# Patient Record
Sex: FEMALE | Race: OTHER | NOT HISPANIC OR LATINO
[De-identification: names, ages, dates, MRNs, and addresses within clinical notes are randomized per-mention and may not be internally consistent; named-entity substitution may affect disease eponyms.]

---

## 2017-12-08 ENCOUNTER — TRANSCRIPTION ENCOUNTER (OUTPATIENT)
Age: 47
End: 2017-12-08

## 2017-12-12 PROBLEM — Z00.00 ENCOUNTER FOR PREVENTIVE HEALTH EXAMINATION: Status: ACTIVE | Noted: 2017-12-12

## 2017-12-14 ENCOUNTER — APPOINTMENT (OUTPATIENT)
Dept: ORTHOPEDIC SURGERY | Facility: CLINIC | Age: 47
End: 2017-12-14
Payer: COMMERCIAL

## 2017-12-14 VITALS
DIASTOLIC BLOOD PRESSURE: 69 MMHG | SYSTOLIC BLOOD PRESSURE: 125 MMHG | WEIGHT: 103 LBS | BODY MASS INDEX: 18.25 KG/M2 | HEART RATE: 66 BPM | HEIGHT: 63 IN

## 2017-12-14 DIAGNOSIS — Z87.39 PERSONAL HISTORY OF OTHER DISEASES OF THE MUSCULOSKELETAL SYSTEM AND CONNECTIVE TISSUE: ICD-10-CM

## 2017-12-14 DIAGNOSIS — Z87.09 PERSONAL HISTORY OF OTHER DISEASES OF THE RESPIRATORY SYSTEM: ICD-10-CM

## 2017-12-14 DIAGNOSIS — J45.909 UNSPECIFIED ASTHMA, UNCOMPLICATED: ICD-10-CM

## 2017-12-14 DIAGNOSIS — M51.36 OTHER INTERVERTEBRAL DISC DEGENERATION, LUMBAR REGION: ICD-10-CM

## 2017-12-14 PROCEDURE — 99204 OFFICE O/P NEW MOD 45 MIN: CPT

## 2017-12-14 PROCEDURE — 72100 X-RAY EXAM L-S SPINE 2/3 VWS: CPT

## 2019-02-10 ENCOUNTER — TRANSCRIPTION ENCOUNTER (OUTPATIENT)
Age: 49
End: 2019-02-10

## 2019-02-10 ENCOUNTER — EMERGENCY (EMERGENCY)
Facility: HOSPITAL | Age: 49
LOS: 1 days | Discharge: HOME | End: 2019-02-10
Attending: EMERGENCY MEDICINE
Payer: COMMERCIAL

## 2019-02-10 VITALS
RESPIRATION RATE: 16 BRPM | WEIGHT: 104.06 LBS | HEIGHT: 62 IN | HEART RATE: 82 BPM | TEMPERATURE: 98 F | DIASTOLIC BLOOD PRESSURE: 69 MMHG | OXYGEN SATURATION: 99 % | SYSTOLIC BLOOD PRESSURE: 109 MMHG

## 2019-02-10 PROCEDURE — 29515 APPLICATION SHORT LEG SPLINT: CPT

## 2019-02-10 PROCEDURE — 99282 EMERGENCY DEPT VISIT SF MDM: CPT | Mod: 25

## 2019-02-10 PROCEDURE — 29515 APPLICATION SHORT LEG SPLINT: CPT | Mod: RT

## 2019-02-10 NOTE — ED PROVIDER NOTE - PHYSICAL EXAMINATION
General: Alert and Oriented. No apparent distress.  Head: Normocephalic and atraumatic.  Eyes: PERRLA with EOMI.  Neck: Supple. Trachea midline.   Cardiac: Normal S1 and S2 w/ RRR. No murmurs appreciated.   Pulmonary: Vesicular breath sounds bilaterally. No increased WOB. No wheezes or crackles.  Abdominal: Soft, non-tender. Normoactive bowel sounds.  Neurologic: No focal sensory or motor deficits.  Musculoskeletal: Strength appropriate in all 4 extremities for age with no limited ROM.  Skin: Color appropriate for race. Intact, warm, and well-perfused.  Psychiatric: Appropriate mood and affect. No apparent risk to self or others. General: Alert and Oriented. No apparent distress.  Head: Normocephalic and atraumatic.  Eyes: PERRLA with EOMI.  Neck: Supple. Trachea midline.   Cardiac: Normal S1 and S2 w/ RRR. No murmurs appreciated.   Pulmonary: Vesicular breath sounds bilaterally. No increased WOB. No wheezes or crackles.  Abdominal: Soft, non-tender. Normoactive bowel sounds.  Neurologic: No focal sensory or motor deficits.  Musculoskeletal: R posterior ankle w/ divot in the achilles tendon. 4/5 plantar felxion 5/5 dorsiflexion of right foot. +DP pulses b/l, sensation intact to feet and ankle b/l. -ve squeeze test  Skin: Color appropriate for race. Intact, warm, and well-perfused.  Psychiatric: Appropriate mood and affect. No apparent risk to self or others.

## 2019-02-10 NOTE — ED PROCEDURE NOTE - ATTENDING CONTRIBUTION TO CARE
Dr. Gardner (Attending Physician)  I supervised the above procedure and agree with the documented note.

## 2019-02-10 NOTE — ED ADULT TRIAGE NOTE - CCCP TRG CHIEF CMPLNT
rt foot/heel injury Implemented All Universal Safety Interventions:  [place] to call system. Call bell, personal items and telephone within reach. Instruct patient to call for assistance. Room bathroom lighting operational. Non-slip footwear when patient is off stretcher. Physically safe environment: no spills, clutter or unnecessary equipment. Stretcher in lowest position, wheels locked, appropriate side rails in place.

## 2019-02-10 NOTE — ED PROVIDER NOTE - OBJECTIVE STATEMENT
48 F no significant PMHx was at the gym doing a side lunge when she twisted her ankle and heard a pop w/ pain. Now feeling as though her ankle is very "loose" and "floating" and the posterior heel feels numb but not painful at rest. Was able to walk after the event to a bench and has since been using crutches. Went to [facility] and was placed in a posterior leg splint, sent to ED to see orthopedist for ruptured achilles tendon.

## 2019-02-10 NOTE — ED PROVIDER NOTE - NSFOLLOWUPINSTRUCTIONS_ED_ALL_ED_FT
Please seek care if you have new chest pain, shortness of breath, fevers, inability to eat or drink, or worsening of symptoms that brought you to the emergency room today.  Please follow up with your primary care physician in 1-2 days or as soon as possible.     Apply ice to affected area for up to 20 minutes no more than 3 times per day. Take ibuprofen 400mg every 6-8 hours only as needed for pain. Keep the affected part of the body elevated to help reduce swelling. Please follow up with an orthopedic surgeon in within 5 days. Referral list provided.

## 2019-02-10 NOTE — ED PROCEDURE NOTE - PROCEDURE ADDITIONAL DETAILS
Short posterior leg splint applied with webril, orthoglass and ace bandage. Instructed to keep splint dry and not bare weight on the leg at least until seeing an orthopedist.  Instructed pt to return to ER if new pain, numbness, skin discoloration.

## 2019-02-10 NOTE — ED CLERICAL - NS ED CLERK NOTE PRE-ARRIVAL INFORMATION; ADDITIONAL PRE-ARRIVAL INFORMATION
CC/Reason For referral: ruptured achilles tendon. placed a posterior splint on pt.  Preferred Consultant(if applicable): no  Who admits for you (if needed): no  Do you have documents you would like to fax over? already faxed by ER referral line to ED fax  Would you still like to speak to an ED attending? no

## 2019-02-10 NOTE — ED PROVIDER NOTE - MEDICAL DECISION MAKING DETAILS
48 F with ankle injury on exam has defect in the achilles tendon. Likely partial tendon tear given strength still mostly intact and plantar felxion intact. Plan: splint, orthopedics f/u

## 2019-02-10 NOTE — ED PROVIDER NOTE - ATTENDING CONTRIBUTION TO CARE
Dr. Gardner (Attending Physician)  I performed a history and physical exam of the patient and discussed their management with the resident. I reviewed the resident's note and agree with the documented findings and plan of care. My medical decision making and observations are found above.

## 2019-02-10 NOTE — ED PROVIDER NOTE - NSFOLLOWUPCLINICS_GEN_ALL_ED_FT
[facility] Orthopedic Surgery  Orthopedic Surgery  [address], [address]rd & [address]th [place] [address]  Phone: [phone]  Fax:   Follow Up Time:

## 2019-02-11 ENCOUNTER — APPOINTMENT (OUTPATIENT)
Dept: ORTHOPEDIC SURGERY | Facility: CLINIC | Age: 49
End: 2019-02-11
Payer: COMMERCIAL

## 2019-02-11 VITALS
WEIGHT: 104 LBS | HEIGHT: 63 IN | SYSTOLIC BLOOD PRESSURE: 99 MMHG | BODY MASS INDEX: 18.43 KG/M2 | DIASTOLIC BLOOD PRESSURE: 66 MMHG | HEART RATE: 66 BPM

## 2019-02-11 DIAGNOSIS — S86.011A STRAIN OF RIGHT ACHILLES TENDON, INITIAL ENCOUNTER: ICD-10-CM

## 2019-02-11 DIAGNOSIS — S86.019A STRAIN OF UNSPECIFIED ACHILLES TENDON, INITIAL ENCOUNTER: ICD-10-CM

## 2019-02-11 PROCEDURE — 99204 OFFICE O/P NEW MOD 45 MIN: CPT

## 2019-02-11 PROCEDURE — 73630 X-RAY EXAM OF FOOT: CPT | Mod: RT

## 2019-02-12 RX ORDER — MELOXICAM 7.5 MG/1
7.5 TABLET ORAL
Qty: 30 | Refills: 0 | Status: ACTIVE | COMMUNITY
Start: 2019-02-12 | End: 1900-01-01

## 2019-02-12 NOTE — DISCUSSION/SUMMARY
[Surgical risks reviewed] : Surgical risks reviewed [de-identified] : Discussed with patient nature of condition, potential course / sequelae, options reviewed including conservative management with equinus cast immobilization deferred by patient and patient specifically requesting surgical intervention operative Achilles tendon repair procedure.  Risks, benefits, indications, alternatives reviewed in detail, risks including but not specifically limited to bleeding, infection, neurovascular / tendon injury, residual pain weakness stiffness swelling instability, retear, failure of repair, wound drainage sequelae, thrombosis, cardiopulmonary sequelae, amongst others; also aware potential necessity ancillary surgery in future, amongst others.  All questions answered, patient understands and wishes to proceed.  Consent SOC / for scheduling.  Cam boot immobilization in the interim, non-weight bearing status, DVT prophylaxis protocol per Medicine PMD.  Educational handouts provided.

## 2019-02-12 NOTE — PHYSICAL EXAM
[de-identified] : Extremity: soft tissue swelling and associated tenderness R distal Achilles with palpable gap, unable to actively plantarflex R ankle, Bonilla testing abnormal R LE.  Nontender R ankle, peroneals, syndesmosis, hindfoot ST, midfoot LF and PTT insertional, and forefoot.  Stable Drawer testing R ankle, calves soft, sensorimotor unchanged, skin intact B LE, 1+ R DP and PT pulses.  AOx3, mood / affect normal. [de-identified] : Radiographs (3v R foot) reveal tiny posterior calcaneal enthesophyte R hindfoot.

## 2019-02-12 NOTE — HISTORY OF PRESENT ILLNESS
[FreeTextEntry1] : 48 year female presents for evaluation of R Achilles tendon rupture x 2/10/19. Pt heard "pop" when she moved her leg while doing [employer] impact exercise regimen. Pt initially went to ER where US showed partial tear of R Achilles tendon and was discharged with splint and crutches. Pt states the pain is intermittent and currently have no pain today. Pt reports numbness on R foot. Pt denies any previous injuries/fx to R foot/ankle. Pt has splint on R LE and using crutches for ambulation. Denies additional musculoskeletal complaints referable to foot/ankle. Pt has hx of R DVT removal x 2001 due to varicose veins. \par

## 2019-02-14 ENCOUNTER — APPOINTMENT (OUTPATIENT)
Dept: ORTHOPEDIC SURGERY | Facility: CLINIC | Age: 49
End: 2019-02-14

## 2019-02-14 NOTE — DISCUSSION/SUMMARY
[de-identified] : Telephone call today with patient canceling Achilles tendon repair surgery and pursuing alternative treatment second opinion.  Patient also aware my recommendation for equinus cast immobilization in the setting of conservative management for this condition.  All questions answered.

## 2019-06-20 ENCOUNTER — APPOINTMENT (OUTPATIENT)
Dept: RADIOLOGY | Facility: IMAGING CENTER | Age: 49
End: 2019-06-20
Payer: COMMERCIAL

## 2019-06-20 ENCOUNTER — OUTPATIENT (OUTPATIENT)
Dept: OUTPATIENT SERVICES | Facility: HOSPITAL | Age: 49
LOS: 1 days | End: 2019-06-20
Payer: COMMERCIAL

## 2019-06-20 ENCOUNTER — APPOINTMENT (OUTPATIENT)
Dept: MAMMOGRAPHY | Facility: IMAGING CENTER | Age: 49
End: 2019-06-20
Payer: COMMERCIAL

## 2019-06-20 DIAGNOSIS — Z00.8 ENCOUNTER FOR OTHER GENERAL EXAMINATION: ICD-10-CM

## 2019-06-20 PROCEDURE — 77067 SCR MAMMO BI INCL CAD: CPT | Mod: 26

## 2019-06-20 PROCEDURE — 77080 DXA BONE DENSITY AXIAL: CPT | Mod: 26

## 2019-06-20 PROCEDURE — 77063 BREAST TOMOSYNTHESIS BI: CPT | Mod: 26

## 2019-06-20 PROCEDURE — 77080 DXA BONE DENSITY AXIAL: CPT

## 2019-06-20 PROCEDURE — 77067 SCR MAMMO BI INCL CAD: CPT

## 2019-06-20 PROCEDURE — 77063 BREAST TOMOSYNTHESIS BI: CPT

## 2021-07-07 NOTE — ED ADULT NURSE NOTE - CCCP TRG CHIEF CMPLNT
Spoke with pt about scheduling an appt for next available in person with Dr. Sanchez after receiving communication. Next available appt scheduled and details confirmed.    rt foot/heel injury

## 2022-03-21 ENCOUNTER — APPOINTMENT (OUTPATIENT)
Dept: RADIOLOGY | Facility: IMAGING CENTER | Age: 52
End: 2022-03-21
Payer: COMMERCIAL

## 2022-03-21 ENCOUNTER — APPOINTMENT (OUTPATIENT)
Dept: ULTRASOUND IMAGING | Facility: IMAGING CENTER | Age: 52
End: 2022-03-21
Payer: COMMERCIAL

## 2022-03-21 ENCOUNTER — APPOINTMENT (OUTPATIENT)
Dept: MAMMOGRAPHY | Facility: IMAGING CENTER | Age: 52
End: 2022-03-21
Payer: COMMERCIAL

## 2022-03-21 ENCOUNTER — OUTPATIENT (OUTPATIENT)
Dept: OUTPATIENT SERVICES | Facility: HOSPITAL | Age: 52
LOS: 1 days | End: 2022-03-21
Payer: COMMERCIAL

## 2022-03-21 DIAGNOSIS — Z00.8 ENCOUNTER FOR OTHER GENERAL EXAMINATION: ICD-10-CM

## 2022-03-21 PROCEDURE — 77080 DXA BONE DENSITY AXIAL: CPT | Mod: 26

## 2022-03-21 PROCEDURE — 72040 X-RAY EXAM NECK SPINE 2-3 VW: CPT

## 2022-03-21 PROCEDURE — 73030 X-RAY EXAM OF SHOULDER: CPT | Mod: 26,LT

## 2022-03-21 PROCEDURE — 76641 ULTRASOUND BREAST COMPLETE: CPT

## 2022-03-21 PROCEDURE — 76641 ULTRASOUND BREAST COMPLETE: CPT | Mod: 26,LT

## 2022-03-21 PROCEDURE — 72040 X-RAY EXAM NECK SPINE 2-3 VW: CPT | Mod: 26

## 2022-03-21 PROCEDURE — 76641 ULTRASOUND BREAST COMPLETE: CPT | Mod: 26,RT

## 2022-03-21 PROCEDURE — 73030 X-RAY EXAM OF SHOULDER: CPT

## 2022-03-21 PROCEDURE — 77067 SCR MAMMO BI INCL CAD: CPT | Mod: 26

## 2022-03-21 PROCEDURE — 77063 BREAST TOMOSYNTHESIS BI: CPT | Mod: 26

## 2022-03-21 PROCEDURE — 77067 SCR MAMMO BI INCL CAD: CPT

## 2022-03-21 PROCEDURE — 77080 DXA BONE DENSITY AXIAL: CPT

## 2022-03-21 PROCEDURE — 77063 BREAST TOMOSYNTHESIS BI: CPT

## 2023-03-31 NOTE — ED PROVIDER NOTE - CARE PLAN
Pt OOB in chair, NAD, denies pain, tray table in front, RN Alyse aware, pt thankful for a potential PT clearance after the procedure on Tuesday. Rn aware of need for new orders. PT does not demonstrate a need for Acute Care PT at this time. Pt demonstrated transfers and gait in room and in HW w/ o LOB. Pt appears to be a safe cautious ambulator at this time.RN aware.
Principal Discharge DX:	Achilles tendon rupture, right, initial encounter